# Patient Record
Sex: MALE | Race: ASIAN | Employment: FULL TIME | URBAN - METROPOLITAN AREA
[De-identification: names, ages, dates, MRNs, and addresses within clinical notes are randomized per-mention and may not be internally consistent; named-entity substitution may affect disease eponyms.]

---

## 2020-12-01 ENCOUNTER — OFFICE VISIT (OUTPATIENT)
Dept: INTERNAL MEDICINE | Facility: CLINIC | Age: 55
End: 2020-12-01
Payer: COMMERCIAL

## 2020-12-01 VITALS
HEART RATE: 79 BPM | SYSTOLIC BLOOD PRESSURE: 148 MMHG | TEMPERATURE: 98 F | BODY MASS INDEX: 30.59 KG/M2 | WEIGHT: 166.2 LBS | HEIGHT: 62 IN | DIASTOLIC BLOOD PRESSURE: 84 MMHG | OXYGEN SATURATION: 97 % | RESPIRATION RATE: 16 BRPM

## 2020-12-01 DIAGNOSIS — K64.4 EXTERNAL HEMORRHOIDS: Primary | ICD-10-CM

## 2020-12-01 PROCEDURE — 99203 OFFICE O/P NEW LOW 30 MIN: CPT | Mod: GE | Performed by: STUDENT IN AN ORGANIZED HEALTH CARE EDUCATION/TRAINING PROGRAM

## 2020-12-01 RX ORDER — GLYBURIDE 5 MG/1
5 TABLET ORAL DAILY
COMMUNITY
Start: 2020-11-16

## 2020-12-01 RX ORDER — BENZOCAINE/MENTHOL 6 MG-10 MG
LOZENGE MUCOUS MEMBRANE 2 TIMES DAILY
Qty: 28 G | Refills: 1 | Status: SHIPPED | OUTPATIENT
Start: 2020-12-01

## 2020-12-01 RX ORDER — POLYETHYLENE GLYCOL 3350 17 G/17G
17 POWDER, FOR SOLUTION ORAL DAILY
Qty: 510 G | Refills: 0 | Status: SHIPPED | OUTPATIENT
Start: 2020-12-01

## 2020-12-01 RX ORDER — AMLODIPINE AND BENAZEPRIL HYDROCHLORIDE 5; 10 MG/1; MG/1
1 CAPSULE ORAL DAILY
COMMUNITY

## 2020-12-01 ASSESSMENT — MIFFLIN-ST. JEOR: SCORE: 1458.13

## 2020-12-01 ASSESSMENT — PAIN SCALES - GENERAL: PAINLEVEL: NO PAIN (0)

## 2020-12-01 NOTE — PROGRESS NOTES
"  [facility] CENTER         HPI:      HPI:  Wesley Welsh is a 55 year old male who presents with rectal problem. 2 weeks ago, patient noticed a small bump in the region of his rectum. Bump is nonpainful. He has had a couple episodes of a small amount of bright red blood on the toilet paper after whipping. Did have some constipation but this has improved. Denies tenesmus or hematochezia. No fevers/chills. Never had similar symptoms in the past. No other complaints at this time.     Problem, Medication and Allergy Lists were reviewed and are current.  Patient is an established patient of this clinic.    ROS: 10 point ROS negative unless stated otherwise in HPI.         Physical Exam:   BP (!) 148/84   Pulse 79   Temp 98  F (36.7  C) (Oral)   Resp 16   Ht 1.575 m (5' 2\")   Wt 75.4 kg (166 lb 3.2 oz)   SpO2 97%   BMI 30.40 kg/m    Body mass index is 30.4 kg/m .  Vitals were reviewed    Physical Exam:   General: Sitting upright, talking in complete sentences, non-distressed  HEENT: Normocephalic, atraumatic, no conjunctival pallor, anicteric  CVS: RRR  Pulm: Non-labored breathing  Abdo: Non-distended, non-tender to palpation  Rectal: External non-thrombosed, non-tender hemorrhoid in 6 o'clock position. No fissures. No masses. No blood or stool on digital rectal exam.   Skin: Warm and dry, no obvious rashes  Neuro: Alert and oriented x3, non-focal   Psych: Normal mood and affect     Assessment and Plan     # Hemorrhoid  - Instructed on not spending execessive time on toilet  - Stool softener: miralax 17g daily for 2 weeks  - Sitz bath once daily for 2 weeks  - Hydrocortisone cream 1% twice daily for 2 weeks  - Benzocaine cream 20% up to 6 times daily as needed   - Patient instructed to return if symptoms worsen or do not improve, consider colorectal consult at that time    Options for treatment and follow-up care were reviewed with the patient. Wesley Welsh engaged in the decision making process and verbalized " understanding of the options discussed and agreed with the final plan.    Jesus Fleming MD  Dec 1, 2020    Pt was seen and plan of care discussed with Dr. Barry.    Jesus Fleming MD  Internal Medicine      While the patient was in clinic, I reviewed the pertinent medical history and results.  I discussed the current findings on physical examination, as well as the patient s diagnosis and treatment plan with the resident and agree with the information as documented with the following exceptions: none.  Latesha Barry MD  Internal Medicine

## 2020-12-01 NOTE — PATIENT INSTRUCTIONS
[facility] Medication Refill Request Information:  * Please contact your pharmacy regarding ANY request for medication refills.  ** [facility] Prescription Fax = [phone]  * Please allow 3 business days for routine medication refills.  * Please allow 5 business days for controlled substance medication refills.     [facility] Test Result notification information:  *You will be notified with in 7-10 days of your appointment day regarding the results of your test.  If you are on MyChart you will be notified as soon as the provider has reviewed the results and signed off on them.    Primary [facility]: [phone]       - Miralax 17g once daily for 2 weeks   - Benzocaine cream 20% rectal ointment up to 6 times daily  - Hydrocortisone 1% external cream twice daily   - Sitz baths once daily for 2 weeks (add Sitz bath salt to bath and soak for 15 minutes)  - Do not read or spend excessive time on the toilet

## 2020-12-01 NOTE — NURSING NOTE
Chief Complaint   Patient presents with     Rectal Problem     Patient comes in to discuss rectal irritation.          Nathanael Upton MA on 12/1/2020 at 1:10 PM

## 2025-04-14 ENCOUNTER — APPOINTMENT (OUTPATIENT)
Dept: INTERPRETER SERVICES | Facility: CLINIC | Age: 60
End: 2025-04-14
Payer: COMMERCIAL

## 2025-04-28 ENCOUNTER — CARE COORDINATION (OUTPATIENT)
Dept: TRANSPLANT | Facility: CLINIC | Age: 60
End: 2025-04-28
Payer: COMMERCIAL

## 2025-04-28 DIAGNOSIS — C92.00 AML (ACUTE MYELOGENOUS LEUKEMIA) (H): Primary | ICD-10-CM

## 2025-04-28 LAB
ABO + RH BLD: NORMAL
BLD GP AB SCN SERPL QL: NEGATIVE
SPECIMEN EXP DATE BLD: NORMAL

## 2025-04-28 NOTE — PROGRESS NOTES
[facility] BMT and Cell Therapy Program  RN Coordinator Pre-Visit Documentation      Tswv-Vatori Welsh is a 60 year old male who has been referred to the [facility] BMT and Cell Therapy Program for hematopoietic cell transplant or immune effector cell therapy.      Referring MD Name: Nette Loomis    Reason for referral: allo HSCT    Link to BMT & CT Program Algorithms      For allos only:    Previous HLA typing? No    Previous formal donor search? No    PRA needed? Yes    CMV IGG needed? Yes    and ABO needed? Yes    Potential family donors to type? Unknown    Need URD consents? Yes    For CAR-T Candidates Only:    Orders placed for virologies: N/A      All relevant clinical notes, labs, imaging, and pathology may be reviewed in Epic Bookmarks under name: Mirtha Campos RNCC      Patient Care Team         Relationship Specialty Notifications Start End    Jesus Fleming MD PCP - General Internal Medicine  11/30/20     Phone: [phone] Fax: [phone]         [address] [place] [id number]              Mirtha Campos RN

## 2025-04-28 NOTE — PROGRESS NOTES
BMT Consultation    [facility] Anitha is a 60 year old male referred by Dr. 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    Oncology History    No history exists.         Hematologic history:  60 year old with PMHx of T2DM and HLD had two week sof fatigue and found to have anemia of Hgb 6.9. 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 NGS with DNMT3A. He started 7+3 with complications of candida glabrata rectal abscess, and poor oral intake from tooth extraction prior to chemotherapy. Had residual disease (15-25%) at time of D14 assessment, and started Baldev+HMA, with biopsy 3/11/2025 with KMT2A+ at 28%. Continued Baldev+HMA with repeat marrow 4/21/25 with 40% cellularity, 12% blasts.     Date Treatment Response Toxicities/Complications   2/4/25 7+3 ( Refractory disease (15-25%)    2/19/25 Baldev (1-21) xAza (7 days) CR(I) MRD+ (FISH+KMT2A), then Relapse.    4/25/25 Revumenib 160mg                  HPI:  Please see my entry above for hematologic history.      He comes in for evaluation following two cycles of Baldev+HMA. He initially tolerated 7+3 other than had some associated fatoumata-rectal abscess that was positive for candida glabrata, pseudomonas and E-faecalis. He also had teeth removed prior to induction chemotherapy, and had poor oral intake due to this.      His appetite has improved some with associated boost and soft diet.         REVIEW OF SYSTEMS  Review of Systems   Constitutional:  Positive for malaise/fatigue. Negative for chills, fever and weight loss.   HENT:  Negative for nosebleeds.    Respiratory:  Negative for sputum production and shortness of breath.    Cardiovascular:  Negative for chest pain, palpitations, claudication and leg swelling.   Gastrointestinal:  Negative for abdominal pain, constipation, nausea and vomiting.    Genitourinary:  Negative for dysuria.   Musculoskeletal:  Negative for back pain and neck pain.   Skin:  Negative for rash.   Neurological:  Negative for dizziness, weakness and headaches.   Endo/Heme/Allergies:  Does not bruise/bleed easily.       Family history: 31 year old son with prior history of AML s/p bone marrow transplant from matched sibling donor in [place].     No past medical history on file.    Past Surgical History:   Procedure Laterality Date    IR LUMBAR PUNCTURE  2/4/2025       No family history on file.    Social History     Tobacco Use    Smoking status: Never    Smokeless tobacco: Never         Allergies   Allergen Reactions    No Known Drug Allergy     Meropenem Rash    Morphine Rash     No respiratory issues, but rash developed after started.Not 100% sure the rash was from morphine, but it's on the top of the differential.        Current Outpatient Medications   Medication Sig Dispense Refill    cefdinir (OMNICEF) 300 MG capsule Take 300 mg by mouth 2 times daily.      doxycycline hyclate (VIBRAMYCIN) 100 MG capsule Take 100 mg by mouth 2 times daily.      HYDROmorphone (DILAUDID) 2 MG tablet Take 2 mg by mouth every 6 hours as needed.      hydrOXYzine mukesh (VISTARIL) 25 MG capsule Take 25 mg by mouth 4 times daily as needed for anxiety.      insulin glargine (LANTUS PEN) 100 UNIT/ML pen Inject 10 Units subcutaneously at bedtime.      losartan (COZAAR) 25 MG tablet Take 12.5 mg by mouth.      ondansetron (ZOFRAN ODT) 4 MG ODT tab Place 4 mg under the tongue every 8 hours as needed.      potassium chloride geovanny ER (KLOR-CON M20) 20 MEQ CR tablet Take 20 mEq by mouth 2 times daily.      prochlorperazine (COMPAZINE) 5 MG tablet Take 5-10 mg by mouth every 8 hours as needed.      senna (SENOKOT) 8.6 MG tablet Take 17.2 mg by mouth daily as needed.      amLODIPine-benazepril (LOTREL) 5-10 MG capsule Take 1 capsule by mouth daily       benzocaine (AMERICAINE) 20 % rectal ointment Place rectally  "every 3 hours as needed for moderate pain Apply up to 6 times daily sparingly to the rectum 28 g 1    glyBURIDE (DIABETA /MICRONASE) 5 MG tablet Take 5 mg by mouth daily      hydrocortisone (CORTAID) 1 % external cream Apply topically 2 times daily To rectum as needed, apply sparingly 28 g 1    metFORMIN (GLUCOPHAGE) 1000 MG tablet TAKE 1 TABLET BY MOUTH TWICE A DAY      polyethylene glycol (MIRALAX) 17 GM/Dose powder Take 17 g by mouth daily 510 g 0       KPS:  70    Physical Exam:   /71   Pulse 73   Temp 97.1  F (36.2  C) (Oral)   Resp 16   Ht 1.545 m (5' 0.83\")   Wt 60.3 kg (133 lb)   SpO2 100%   BMI 25.27 kg/m      Physical Exam  Vitals reviewed.   Constitutional:       Appearance: Normal appearance.   HENT:      Head: Normocephalic and atraumatic.      Nose: Nose normal.      Mouth/Throat:      Mouth: Mucous membranes are moist.      Dentition: Abnormal dentition. Dental caries present.   Eyes:      Pupils: Pupils are equal, round, and reactive to light.   Cardiovascular:      Rate and Rhythm: Normal rate and regular rhythm.      Pulses: Normal pulses.      Heart sounds: Normal heart sounds. No murmur heard.  Pulmonary:      Effort: Pulmonary effort is normal. No respiratory distress.      Breath sounds: Normal breath sounds.   Abdominal:      General: Bowel sounds are normal.      Palpations: Abdomen is soft.   Musculoskeletal:         General: No swelling. Normal range of motion.      Cervical back: Normal range of motion and neck supple. No rigidity.   Skin:     General: Skin is warm and dry.   Neurological:      Mental Status: He is alert and oriented to person, place, and time. Mental status is at baseline.   Psychiatric:         Mood and Affect: Mood normal.         Behavior: Behavior normal.         Thought Content: Thought content normal.         Judgment: Judgment normal.            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 NGS with DNMT3A.  - 7+3 started 2/4/25   - Bone marrow biopsy 2/18/25 with refractory disease (15-25%)  - Venetoclax (D1-D21 each 28 day cycle with Azacitidine started.  - Bone marrow biopsy at 3/30/25 with MRD+ at 28% FISH.   - Completed second cycle of Baldev/HMA with residual blasts  - Started on Revumenib due to positive   Plan:    Referring Oncologist Office will do the following:  Follow up with dentistry for any additional extraction of teeth.           BMT Office will do the following  CT face at time of workup to rule out periosteal disease/ periapical abscess.   Genetic referral (ordered)     Primary Desired Protocol: Optimize (if HLA <8/8) or [id number] if HLA 8/8  Arm:   Preferred Graft Source: PBSC    Alternate/Back-up Protocol: N/A  Arm: N/A    Clinical Trials Discussed: No  Approximate Timeline to workup: 2-3 months pending disease response.     Orders placed for interim testing prior to transplant workup: N/A      HEMATOLOGY, COAGULATION    Given son with AML with CEBPA, DNTM3A leukemia; had genetic counseling referral, pending collection of [employer]itae test with primary oncologist.     IMMUNOCOMPROMISED HOST  History of perianal abscess draining candida glabrata, E-faecalis and     GASTROINTESTINAL  History of perianal abscess as above, resolving on last CT imaging 4/2025.   Resulting open fistula with no active drainage, healthy tissue     CARDIOVASCULAR  On Atorvastatin due to concurrent T2DMs    ENDOCRINE  History of T2DM,   - On Lantus, Metformin.       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     Today I discussed the above diagnosis with Wesley Welsh. We discussed the natural  history of the disease and treatment to date. We reviewed all the available diagnostic information. We talked about general indications of transplant that include patient, disease and donor factors.     We also reviewed again the role of allogeneic stem cell transplantation in this disease. I described the process of work up of a potential recipient to confirm good organ function and reserve, reassess disease and decide on risks/benefit. We also discussed in great detail the process of the actual transplant itself, with discussion of different donor stem cell options. We discussed the role of the preparative regimen to clear any residual disease and to ablate the bone marrow, followed by infusion of the HSC graft. We discussed the expected toxicities and side effects, including organ toxicity, expected pancytopenia and need for transfusion support, risk of infection or bleeding, possibility of delayed or non-engraftment, and the risk of treatment related mortality (10-20%). We also discussed the risk of acute or chronic GVHD (overall ~50%, 10-20% for severe GVH) and the need for immunosuppression within the first 100 days and perhaps beyond, depending on GVHD status then. I also mentioned the possibility of relapse which I would estimate at 30-40%. We discussed supportive care, needing a line with associated complications, and the critical need for a caregiver and proximity to [facility].    All of their questions were answered to their satisfaction.  We will proceed with donor workup at this time.       Known issues that I take into account for medical decisions, with salient changes to the plan considering these complexities noted above.    Patient Active Problem List   Diagnosis    Type 2 diabetes mellitus without complication, with long-term current use of insulin (H)    Dyslipidemia    Acute myeloid leukemia not having achieved remission (H)    Perianal abscess       I spent 90 minutes in the care of this patient  today, which included time necessary for preparation for the visit, obtaining history, ordering medications/tests/procedures as medically indicated, review of pertinent medical literature, counseling of the patient, communication of recommendations to the care team, and documentation time.    The longitudinal plan of care for the diagnosis(es)/condition(s) as documented were addressed during this visit. Due to the added complexity in care, I will continue to support [facility] in the subsequent management and with ongoing continuity of care.    Davis Coleman MD  April 29, 2025        ------------------------------------------------------------------------------------------------------------------------------------------------    Patient Care Team         Relationship Specialty Notifications Start End    Jesus Fleming MD PCP - General Internal Medicine  11/30/20     Phone: [phone] Fax: [phone]         [address] [place] [id number]

## 2025-04-29 ENCOUNTER — OFFICE VISIT (OUTPATIENT)
Dept: TRANSPLANT | Facility: CLINIC | Age: 60
End: 2025-04-29
Payer: COMMERCIAL

## 2025-04-29 ENCOUNTER — ALLIED HEALTH/NURSE VISIT (OUTPATIENT)
Dept: TRANSPLANT | Facility: CLINIC | Age: 60
End: 2025-04-29
Payer: COMMERCIAL

## 2025-04-29 ENCOUNTER — LAB (OUTPATIENT)
Dept: LAB | Facility: CLINIC | Age: 60
End: 2025-04-29
Payer: COMMERCIAL

## 2025-04-29 VITALS
WEIGHT: 133 LBS | RESPIRATION RATE: 16 BRPM | SYSTOLIC BLOOD PRESSURE: 130 MMHG | BODY MASS INDEX: 25.11 KG/M2 | HEIGHT: 61 IN | OXYGEN SATURATION: 100 % | DIASTOLIC BLOOD PRESSURE: 71 MMHG | TEMPERATURE: 97.1 F | HEART RATE: 73 BPM

## 2025-04-29 DIAGNOSIS — C92.00 ACUTE MYELOID LEUKEMIA NOT HAVING ACHIEVED REMISSION (H): Primary | ICD-10-CM

## 2025-04-29 DIAGNOSIS — K61.0 PERIANAL ABSCESS: ICD-10-CM

## 2025-04-29 DIAGNOSIS — E11.9 TYPE 2 DIABETES MELLITUS WITHOUT COMPLICATION, WITH LONG-TERM CURRENT USE OF INSULIN (H): ICD-10-CM

## 2025-04-29 DIAGNOSIS — C92.00 AML (ACUTE MYELOGENOUS LEUKEMIA) (H): ICD-10-CM

## 2025-04-29 DIAGNOSIS — Z79.4 TYPE 2 DIABETES MELLITUS WITHOUT COMPLICATION, WITH LONG-TERM CURRENT USE OF INSULIN (H): ICD-10-CM

## 2025-04-29 DIAGNOSIS — E78.5 DYSLIPIDEMIA: ICD-10-CM

## 2025-04-29 DIAGNOSIS — Z71.9 VISIT FOR COUNSELING: Primary | ICD-10-CM

## 2025-04-29 DIAGNOSIS — C92.00 AML (ACUTE MYELOGENOUS LEUKEMIA) (H): Primary | ICD-10-CM

## 2025-04-29 PROCEDURE — 86828 HLA CLASS I&II ANTIBODY QUAL: CPT

## 2025-04-29 PROCEDURE — 99213 OFFICE O/P EST LOW 20 MIN: CPT | Performed by: INTERNAL MEDICINE

## 2025-04-29 PROCEDURE — 36415 COLL VENOUS BLD VENIPUNCTURE: CPT

## 2025-04-29 PROCEDURE — 86644 CMV ANTIBODY: CPT

## 2025-04-29 PROCEDURE — 81382 HLA II TYPING 1 LOC HR: CPT

## 2025-04-29 PROCEDURE — 86901 BLOOD TYPING SEROLOGIC RH(D): CPT

## 2025-04-29 RX ORDER — DOXYCYCLINE 100 MG/1
100 CAPSULE ORAL 2 TIMES DAILY
COMMUNITY
Start: 2025-04-25 | End: 2025-05-09

## 2025-04-29 RX ORDER — LOSARTAN POTASSIUM 25 MG/1
12.5 TABLET ORAL
COMMUNITY
Start: 2025-03-31

## 2025-04-29 RX ORDER — POTASSIUM CHLORIDE 1500 MG/1
20 TABLET, EXTENDED RELEASE ORAL 2 TIMES DAILY
COMMUNITY
Start: 2025-04-23

## 2025-04-29 RX ORDER — CEFDINIR 300 MG/1
300 CAPSULE ORAL 2 TIMES DAILY
COMMUNITY
Start: 2025-04-25

## 2025-04-29 RX ORDER — SENNOSIDES A AND B 8.6 MG/1
17.2 TABLET, FILM COATED ORAL DAILY PRN
COMMUNITY
Start: 2025-03-25

## 2025-04-29 RX ORDER — PROCHLORPERAZINE MALEATE 5 MG/1
5-10 TABLET ORAL EVERY 8 HOURS PRN
COMMUNITY
Start: 2025-04-25

## 2025-04-29 RX ORDER — HYDROMORPHONE HYDROCHLORIDE 2 MG/1
2 TABLET ORAL EVERY 6 HOURS PRN
COMMUNITY
Start: 2025-03-24

## 2025-04-29 RX ORDER — HYDROXYZINE PAMOATE 25 MG/1
25 CAPSULE ORAL 4 TIMES DAILY PRN
COMMUNITY
Start: 2025-03-25

## 2025-04-29 RX ORDER — ONDANSETRON 4 MG/1
4 TABLET, ORALLY DISINTEGRATING ORAL EVERY 8 HOURS PRN
COMMUNITY
Start: 2025-03-25

## 2025-04-29 ASSESSMENT — ENCOUNTER SYMPTOMS
CHILLS: 0
CONSTIPATION: 0
SHORTNESS OF BREATH: 0
NECK PAIN: 0
VOMITING: 0
WEIGHT LOSS: 0
NAUSEA: 0
SPUTUM PRODUCTION: 0
CLAUDICATION: 0
DIZZINESS: 0
ABDOMINAL PAIN: 0
WEAKNESS: 0
FEVER: 0
PALPITATIONS: 0
HEADACHES: 0
BACK PAIN: 0
DYSURIA: 0
BRUISES/BLEEDS EASILY: 0

## 2025-04-29 ASSESSMENT — PAIN SCALES - GENERAL: PAINLEVEL_OUTOF10: NO PAIN (0)

## 2025-04-29 NOTE — PROGRESS NOTES
Blood and Marrow Transplant - New Evaluation Appointment    Spoke with Gloria Rankin and Lizette, patient's spouse, following visit with Dr. Coleman. I explained the role of the nurse coordinator throughout the process, as well as general time line and expectations for next steps. We discussed the necessity of a caregiver and the program's proximity requirements. All questions were answered.     Plan: Allogeneic Transplant, pending URD identification.     Timeline Notes:TBD pending donor identification.     Contact information provided for :  no    Allo:  HLA typing drawn: Yes    PRA typing drawn:  Yes    CMV-IgG and ABO-Rh drawn or in record: Yes    Contact information provided for : Yes    Will sibling typing kits need to be sent? No    Financial Release for URD search obtained:  Yes    CAR-T:  Virologies drawn:  N/A  If recommendation is made for neurotox prophylaxis, MD reminded to enter and sign orders for [id number] in supportive care treatment plan: N/A    Auto for MM/Amyloidosis:  Outpatient Infusion: N/A    Phase Status updated: yes    BMT FRAILTY ASSESSMENT (55+)  BMT Fried Frailty          4/29/2025    11:58   Fried Frailty   Lost>10 pounds unintenionally last year N   Exhaustion Score 0   Slowness Score 1   Weakness/ Strength Score 1   Low Activity Level Score 0   Final Score Not Frail   Final Score Number 2   Sit Stand Assessment   Patient able to perform 5 chair stands Y   Patient is able to perform stand with Feet Side by Side? Y   First attempt (in seconds): 18   Patient is able to perform Semi-Tandem Stand? Y   First attemp (in seconds): 15   Patient is able to perform Tandem Stand? Y   First attemp (in seconds): 15        Pt is 55+, documented frail, internal: No  If YES, order Cancer Rehab Referral

## 2025-04-29 NOTE — LETTER
4/29/2025      Wesley Welsh  [address] [place] [address]      Dear Colleague,    Thank you for referring your patient, Wesley Welsh, to the [facility] BLOOD AND MARROW TRANSPLANT PROGRAM [place]. Please see a copy of my visit note below.           BMT Consultation    Wesley Welsh is a 60 year old male referred by Dr. 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    Oncology History    No history exists.         Hematologic history:  60 year old with PMHx of T2DM and HLD had two week sof fatigue and found to have anemia of Hgb 6.9. 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 NGS with DNMT3A. He started 7+3 with complications of candida glabrata rectal abscess, and poor oral intake from tooth extraction prior to chemotherapy. Had residual disease (15-25%) at time of D14 assessment, and started Baldev+HMA, with biopsy 3/11/2025 with KMT2A+ at 28%. Continued Baldev+HMA with repeat marrow 4/21/25 with 40% cellularity, 12% blasts.     Date Treatment Response Toxicities/Complications   2/4/25 7+3 ( Refractory disease (15-25%)    2/19/25 Baldev (1-21) xAza (7 days) CR(I) MRD+ (FISH+KMT2A), then Relapse.    4/25/25 Revumenib 160mg                  HPI:  Please see my entry above for hematologic history.      He comes in for evaluation following two cycles of Baldev+HMA. He initially tolerated 7+3 other than had some associated fatoumata-rectal abscess that was positive for candida glabrata, pseudomonas and E-faecalis. He also had teeth removed prior to induction chemotherapy, and had poor oral intake due to this.      His appetite has improved some with associated boost and soft diet.         REVIEW OF SYSTEMS  Review of Systems   Constitutional:  Positive for malaise/fatigue. Negative for chills, fever and weight loss.   HENT:  Negative  for nosebleeds.    Respiratory:  Negative for sputum production and shortness of breath.    Cardiovascular:  Negative for chest pain, palpitations, claudication and leg swelling.   Gastrointestinal:  Negative for abdominal pain, constipation, nausea and vomiting.   Genitourinary:  Negative for dysuria.   Musculoskeletal:  Negative for back pain and neck pain.   Skin:  Negative for rash.   Neurological:  Negative for dizziness, weakness and headaches.   Endo/Heme/Allergies:  Does not bruise/bleed easily.       Family history: 31 year old son with prior history of AML s/p bone marrow transplant from matched sibling donor in [place].     No past medical history on file.    Past Surgical History:   Procedure Laterality Date     IR LUMBAR PUNCTURE  2/4/2025       No family history on file.    Social History     Tobacco Use     Smoking status: Never     Smokeless tobacco: Never         Allergies   Allergen Reactions     No Known Drug Allergy      Meropenem Rash     Morphine Rash     No respiratory issues, but rash developed after started.Not 100% sure the rash was from morphine, but it's on the top of the differential.        Current Outpatient Medications   Medication Sig Dispense Refill     cefdinir (OMNICEF) 300 MG capsule Take 300 mg by mouth 2 times daily.       doxycycline hyclate (VIBRAMYCIN) 100 MG capsule Take 100 mg by mouth 2 times daily.       HYDROmorphone (DILAUDID) 2 MG tablet Take 2 mg by mouth every 6 hours as needed.       hydrOXYzine mukesh (VISTARIL) 25 MG capsule Take 25 mg by mouth 4 times daily as needed for anxiety.       insulin glargine (LANTUS PEN) 100 UNIT/ML pen Inject 10 Units subcutaneously at bedtime.       losartan (COZAAR) 25 MG tablet Take 12.5 mg by mouth.       ondansetron (ZOFRAN ODT) 4 MG ODT tab Place 4 mg under the tongue every 8 hours as needed.       potassium chloride geovanny ER (KLOR-CON M20) 20 MEQ CR tablet Take 20 mEq by mouth 2 times daily.       prochlorperazine (COMPAZINE) 5  "MG tablet Take 5-10 mg by mouth every 8 hours as needed.       senna (SENOKOT) 8.6 MG tablet Take 17.2 mg by mouth daily as needed.       amLODIPine-benazepril (LOTREL) 5-10 MG capsule Take 1 capsule by mouth daily        benzocaine (AMERICAINE) 20 % rectal ointment Place rectally every 3 hours as needed for moderate pain Apply up to 6 times daily sparingly to the rectum 28 g 1     glyBURIDE (DIABETA /MICRONASE) 5 MG tablet Take 5 mg by mouth daily       hydrocortisone (CORTAID) 1 % external cream Apply topically 2 times daily To rectum as needed, apply sparingly 28 g 1     metFORMIN (GLUCOPHAGE) 1000 MG tablet TAKE 1 TABLET BY MOUTH TWICE A DAY       polyethylene glycol (MIRALAX) 17 GM/Dose powder Take 17 g by mouth daily 510 g 0       KPS:  70    Physical Exam:   /71   Pulse 73   Temp 97.1  F (36.2  C) (Oral)   Resp 16   Ht 1.545 m (5' 0.83\")   Wt 60.3 kg (133 lb)   SpO2 100%   BMI 25.27 kg/m      Physical Exam  Vitals reviewed.   Constitutional:       Appearance: Normal appearance.   HENT:      Head: Normocephalic and atraumatic.      Nose: Nose normal.      Mouth/Throat:      Mouth: Mucous membranes are moist.      Dentition: Abnormal dentition. Dental caries present.   Eyes:      Pupils: Pupils are equal, round, and reactive to light.   Cardiovascular:      Rate and Rhythm: Normal rate and regular rhythm.      Pulses: Normal pulses.      Heart sounds: Normal heart sounds. No murmur heard.  Pulmonary:      Effort: Pulmonary effort is normal. No respiratory distress.      Breath sounds: Normal breath sounds.   Abdominal:      General: Bowel sounds are normal.      Palpations: Abdomen is soft.   Musculoskeletal:         General: No swelling. Normal range of motion.      Cervical back: Normal range of motion and neck supple. No rigidity.   Skin:     General: Skin is warm and dry.   Neurological:      Mental Status: He is alert and oriented to person, place, and time. Mental status is at baseline. "   Psychiatric:         Mood and Affect: Mood normal.         Behavior: Behavior normal.         Thought Content: Thought content normal.         Judgment: Judgment normal.            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 NGS with DNMT3A.  - 7+3 started 2/4/25   - Bone marrow biopsy 2/18/25 with refractory disease (15-25%)  - Venetoclax (D1-D21 each 28 day cycle with Azacitidine started.  - Bone marrow biopsy at 3/30/25 with MRD+ at 28% FISH.   - Completed second cycle of Baldev/HMA with residual blasts  - Started on Revumenib due to positive   Plan:    Referring Oncologist Office will do the following:  Follow up with dentistry for any additional extraction of teeth.           BMT Office will do the following  CT face at time of workup to rule out periosteal disease/ periapical abscess.   Genetic referral (ordered)     Primary Desired Protocol: Optimize (if HLA <8/8) or [id number] if HLA 8/8  Arm:   Preferred Graft Source: PBSC    Alternate/Back-up Protocol: N/A  Arm: N/A    Clinical Trials Discussed: No  Approximate Timeline to workup: 2-3 months pending disease response.     Orders placed for interim testing prior to transplant workup: N/A      HEMATOLOGY, COAGULATION    Given son with AML with CEBPA, DNTM3A leukemia; had genetic counseling referral, pending collection of Invitae test with primary oncologist.     IMMUNOCOMPROMISED HOST  History of perianal abscess draining candida glabrata, E-faecalis and     GASTROINTESTINAL  History of perianal abscess as above, resolving on last CT imaging 4/2025.   Resulting open fistula with no active drainage, healthy tissue     CARDIOVASCULAR  On Atorvastatin due to concurrent T2DMs    ENDOCRINE  History of T2DM,   - On Lantus, Metformin.        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     Today I discussed the above diagnosis with Wesley Welsh. We discussed the natural history of the disease and treatment to date. We reviewed all the available diagnostic information. We talked about general indications of transplant that include patient, disease and donor factors.     We also reviewed again the role of allogeneic stem cell transplantation in this disease. I described the process of work up of a potential recipient to confirm good organ function and reserve, reassess disease and decide on risks/benefit. We also discussed in great detail the process of the actual transplant itself, with discussion of different donor stem cell options. We discussed the role of the preparative regimen to clear any residual disease and to ablate the bone marrow, followed by infusion of the HSC graft. We discussed the expected toxicities and side effects, including organ toxicity, expected pancytopenia and need for transfusion support, risk of infection or bleeding, possibility of delayed or non-engraftment, and the risk of treatment related mortality (10-20%). We also discussed the risk of acute or chronic GVHD (overall ~50%, 10-20% for severe GVH) and the need for immunosuppression within the first 100 days and perhaps beyond, depending on GVHD status then. I also mentioned the possibility of relapse which I would estimate at 30-40%. We discussed supportive care, needing a line with associated complications, and the critical need for a caregiver and proximity to [facility].    All of their questions were answered to their satisfaction.  We will proceed with donor workup at this time.       Known issues that I take into account for medical decisions, with salient changes to the plan considering these complexities noted  above.    Patient Active Problem List   Diagnosis     Type 2 diabetes mellitus without complication, with long-term current use of insulin (H)     Dyslipidemia     Acute myeloid leukemia not having achieved remission (H)     Perianal abscess       I spent 90 minutes in the care of this patient today, which included time necessary for preparation for the visit, obtaining history, ordering medications/tests/procedures as medically indicated, review of pertinent medical literature, counseling of the patient, communication of recommendations to the care team, and documentation time.    The longitudinal plan of care for the diagnosis(es)/condition(s) as documented were addressed during this visit. Due to the added complexity in care, I will continue to support [facility] in the subsequent management and with ongoing continuity of care.    Davis Coleman MD  April 29, 2025        ------------------------------------------------------------------------------------------------------------------------------------------------    Patient Care Team         Relationship Specialty Notifications Start End    Jesus Fleming MD PCP - General Internal Medicine  11/30/20     Phone: [phone] Fax: [phone]         [address] [place] [id number]                 Again, thank you for allowing me to participate in the care of your patient.        Sincerely,        Davis Coleman MD    Electronically signed

## 2025-04-29 NOTE — NURSING NOTE
"Oncology Rooming Note    April 29, 2025 12:11 PM   Tariqwv-Massiel Welsh is a 60 year old male who presents for:    Chief Complaint   Patient presents with    Oncology Clinic Visit     AML     Initial Vitals: /71   Pulse 73   Temp 97.1  F (36.2  C) (Oral)   Resp 16   Ht 1.545 m (5' 0.83\")   Wt 60.3 kg (133 lb)   SpO2 100%   BMI 25.27 kg/m   Estimated body mass index is 25.27 kg/m  as calculated from the following:    Height as of this encounter: 1.545 m (5' 0.83\").    Weight as of this encounter: 60.3 kg (133 lb). Body surface area is 1.61 meters squared.  No Pain (0) Comment: Data Unavailable   No LMP for male patient.  Allergies reviewed: Yes  Medications reviewed: Yes    Medications: Medication refills not needed today.  Pharmacy name entered into [facility]: [id number] [place], [place] - [address] [place] [address]    Frailty Screening:   Is the patient here for a new oncology consult visit in cancer care? 2. No    PHQ9:  Did this patient require a PHQ9?: No      Clinical concerns:        Kim Rodriguez CMA              "

## 2025-04-29 NOTE — NURSING NOTE
Chief Complaint   Patient presents with    Blood Draw     Labs drawn via PICC by RN.        Pat Jimenez RN

## 2025-04-29 NOTE — NURSING NOTE
BMT BAINS Frailty assessment completed with patient in clinic.   Patient had no questions and expressed understanding of   what assessment was being done.     Kim Rodriguez CMA (AAMA)

## 2025-04-29 NOTE — PROGRESS NOTES
Blood and Marrow Transplant   New Transplant Visit with   Clinical     Assessment completed on 4/29/2025 in the [address] [place] [address] BMT Clinic. Information for this assessment was provided by pt and pt's Spouse/Partner report, consultation with medical team, and medical chart review.     Present:  Patient: Gloria Welsh  Spouse/Partner : Lizette Welsh  : DU Bonilla LISW    Medical Team   Nurse Coordinator:Mirtha Campos RN  BMT Physician: Davis Coleman MD      Diagnosis: Acute Myeloid Leukemia (AML)  Diagnosis Date: 01/31/2025    Presenting Information:  Pt is a 60 year old male diagnosed with Acute Myeloid Leukemia (AML) . Pt was diagnosed on 01/31/2025. Pt presents for Allogeneic stem cell transplant discussion.      Contact Information:  Home Phone [phone]   Mobile [phone]   Patient email address: [email or web address]  Patient spouse Lizette's phone: [phone]    Special Needs:   Patient's address in chart is incorrect.      Patient lives in [place] and they are 30 miles or 40-50 minutes away from the hospital.  Patient does not live in [facility] but rather [place].  We discussed that patient lives quite far and may need to relocate to [facility].  SW gave the patient a copy of the [facility] brochure and discussed the referral process.         Relocation Requirement:     Pt lives at [address] [place] [address] ([place] [facility] Pt will need to relocate and will need local lodging. SW discussed relocation and explained in detail the different lodging options. Pt and and their caregiver are in agreement with relocating.    Living Situation: Lives with spouse  [address] [place], [place] [id number]    Family Information:   Spouse: Lizette  Siblings: None in the area  Children: Patient and spouse have two kids who live one in [place] and one in [place].  Their other son lives in [place].    Education/Employment:  Pt currently: Full time  Employer:  [employer]  Occupation:     Spouse Employed: Full time  Employer: [employer]  Occupation:     Insurance:   Payer/Plan Subscriber Name Rel Member # Group #   BCBS - BCBS OUT OF ST* JAYSON,TSWV VAM Self [id number]      [address] [address]    . No insurance concerns identified at this time. MAURICIO provided information regarding the insurance authorization process and the role of the [employer] . MAURICIO provided contact info for the [employer]  and referred pt to them for future insurance questions.     Finances:   Pt's source of income is Employment Income. Pt identified financial concern related to BMT including both patient and spouse having used up all of their time off after helping their son who went through an allo transplant.  SW discussed the possibility of [facility] applying for social security as another source of income.  Reviewed process for applying and compassionate allowances for Social Security Disability.    SW discussed nicholas options and asked pt to let SW know if they would like to apply in the future. MAURICIO gave patient and spouse a list of financial resources along with the list in the NT folder.    Caregiver:   MAURICIO discussed with the Pt and  Pt's Spouse/Partner the caregiver role and expectation at length. Pt is agreeable to having a full time caregiver for the minimum of 30 Days Caregiving, 60 days no driving until cleared by the BMT Physician. Pt's identified caregivers are Spouse/Partner. Caregiver education and information provided. No caregiver concerns identified.     Healthcare Directive:  No. MAURICIO provided education and forms. MAURICIO encouraged pt to have discussions with their family regarding their health care wishes.  In the absence of a healthcare document, MAURICIO discussed the [place] Policy on who would make decisions on pt's behalf if pt did not have the capacity to make healthcare decisions.     Resources Provided:  -BMT Information Book  -BMT  Resources Packet  -Healthcare Directive  -Honoring Choices - Your Rights: Making Your Own Health Care Treatment Decisions  -Caregiver Contract/Description  -Transplant Unit Description and Information   -Lodging Resources    Identified Concerns:  Patient and spouse identified that they will need to take time off work and the patient has not been working for the past few months. Pt spouse stated she needs to be able to work at least one day a week and has talked to her kids about stepping in to do care taking at least once a week.  Spouse will be the primary care taker.        Summary:  Pt presents to [address] [place] [address] BMT Clinic regarding an Allogeneic  transplant. Pt and pt's Spouse/Partner asked good/appropriate questions regarding psychosocial factors related to BMT; all questions were addressed. Pt presented as coping well. Pt's affect was restricted. Patient indicated they are currently feeling overwhelmed. Family's affect was broad.    Plan:   SW provided contact information and encouraged pt to contact SW with any additional questions, concerns, resources and/or for support. SW will continue to follow pt to provide support and guidance with resources as needed.     DU Bonilla, MARLON  Adult Blood & Marrow Transplant   Phone: [phone]  ZURI Searchable at BMT SW 3

## 2025-04-30 LAB
CMV IGG SERPL IA-ACNC: 6.6 U/ML
CMV IGG SERPL IA-ACNC: ABNORMAL

## 2025-05-01 PROBLEM — K61.0 PERIANAL ABSCESS: Status: ACTIVE | Noted: 2025-05-01

## 2025-05-01 PROBLEM — C92.00 ACUTE MYELOID LEUKEMIA NOT HAVING ACHIEVED REMISSION (H): Status: ACTIVE | Noted: 2025-05-01

## 2025-05-01 LAB
FLOWPRA1 CELL: NORMAL
FLOWPRA1 COMMENTS: NORMAL
FLOWPRA1 RESULT: NORMAL
FLOWPRA1 TEST METHOD: NORMAL
FLOWPRA2 CELL: NORMAL
FLOWPRA2 COMMENTS: NORMAL
FLOWPRA2 RESULT: NORMAL
FLOWPRA2 TEST METHOD: NORMAL

## 2025-05-05 LAB
A*LOCUS SEROLOGIC EQUIVALENT: 11
ABTEST METHOD: NORMAL
B*LOCUS SEROLOGIC EQUIVALENT: 13
BW-1: NORMAL
C*LOCUS SEROLOGIC EQUIVALENT: 10
C*SEROLOGIC EQUIVALENT: 7
DQB1*LOCUS SEROLOGIC EQUIVALENT: 7
DQB1*SEROLOGIC EQUIVALENT: 6
DRB1*LOCUS SEROLOGIC EQUIVALENT: 12
DRB1*SEROLOGIC EQUIVALENT: 15
DRB3*LOCUS SEROLOGIC EQUIVALENT: 52
DRB5*SEROLOGIC EQUIVALENT: 51
DRSSO TEST METHOD: NORMAL
HLA-A HIGH RES: NORMAL
HLA-B HIGH RES: NORMAL
HLA-CW HIGH RES: NORMAL
HLA-CW HIGH RES: NORMAL
HLA-DPA1 HIGH RES: NORMAL
HLA-DPB1 HIGH RES: NORMAL
HLA-DQA1 HIGH RES: NORMAL
HLA-DQA1 HIGH RES: NORMAL
HLA-DQB1 HIGH RES: NORMAL
HLA-DQB1 HIGH RES: NORMAL
HLA-DRB1 HIGH RES: NORMAL
HLA-DRB1 HIGH RES: NORMAL
HLA-DRB3 HIGH RES: NORMAL
HLA-DRB5 HIGH RES: NORMAL

## 2025-05-10 ENCOUNTER — HEALTH MAINTENANCE LETTER (OUTPATIENT)
Age: 60
End: 2025-05-10

## 2025-05-29 ENCOUNTER — MEDICAL CORRESPONDENCE (OUTPATIENT)
Dept: TRANSPLANT | Facility: CLINIC | Age: 60
End: 2025-05-29
Payer: COMMERCIAL

## 2025-05-29 DIAGNOSIS — Z11.59 SCREENING FOR VIRAL DISEASE: ICD-10-CM

## 2025-05-29 DIAGNOSIS — Z01.818 PREOP EXAMINATION: Primary | ICD-10-CM

## 2025-05-29 DIAGNOSIS — Z86.2 PERSONAL HISTORY OF DISEASES OF BLOOD AND BLOOD-FORMING ORGANS: ICD-10-CM

## 2025-05-29 DIAGNOSIS — C92.00 LEUKEMIA, ACUTE MYELOID (H): ICD-10-CM
